# Patient Record
Sex: FEMALE | URBAN - NONMETROPOLITAN AREA
[De-identification: names, ages, dates, MRNs, and addresses within clinical notes are randomized per-mention and may not be internally consistent; named-entity substitution may affect disease eponyms.]

---

## 2021-12-15 ENCOUNTER — HOSPITAL ENCOUNTER (EMERGENCY)
Facility: HOSPITAL | Age: 41
Discharge: LEFT BEFORE DISCHARGE | End: 2021-12-15

## 2021-12-15 VITALS
WEIGHT: 170 LBS | RESPIRATION RATE: 16 BRPM | DIASTOLIC BLOOD PRESSURE: 92 MMHG | TEMPERATURE: 97.9 F | SYSTOLIC BLOOD PRESSURE: 151 MMHG | OXYGEN SATURATION: 98 % | BODY MASS INDEX: 25.76 KG/M2 | HEIGHT: 68 IN | HEART RATE: 97 BPM

## 2021-12-15 PROCEDURE — 99211 OFF/OP EST MAY X REQ PHY/QHP: CPT

## 2021-12-15 NOTE — ED NOTES
Patient called back to room. Patient not found in lobby per [facility] tech. Restroom checked. Will call again.      Fahad Massey RN  12/15/21 [id number]

## 2021-12-15 NOTE — ED NOTES
Called again for patient and patient could not be located,Charge Nurse notified.      Joe Jain, FANTASMA  12/15/21 [id number]

## 2021-12-15 NOTE — ED NOTES
Patient name called for second time per Joe MARIANO LPN. Patient not found in [facility].      Fahad Massey, RN  12/15/21 [id number]